# Patient Record
Sex: FEMALE | Race: WHITE | NOT HISPANIC OR LATINO
[De-identification: names, ages, dates, MRNs, and addresses within clinical notes are randomized per-mention and may not be internally consistent; named-entity substitution may affect disease eponyms.]

---

## 2017-01-21 ENCOUNTER — HOSPITAL (OUTPATIENT)
Dept: OTHER | Age: 22
End: 2017-01-21
Attending: EMERGENCY MEDICINE

## 2018-07-02 ENCOUNTER — OFFICE VISIT (OUTPATIENT)
Dept: FAMILY MEDICINE | Facility: CLINIC | Age: 23
End: 2018-07-02
Payer: COMMERCIAL

## 2018-07-02 VITALS
DIASTOLIC BLOOD PRESSURE: 84 MMHG | SYSTOLIC BLOOD PRESSURE: 121 MMHG | TEMPERATURE: 98.8 F | RESPIRATION RATE: 14 BRPM | OXYGEN SATURATION: 98 % | BODY MASS INDEX: 21.63 KG/M2 | WEIGHT: 134.6 LBS | HEART RATE: 83 BPM | HEIGHT: 66 IN

## 2018-07-02 DIAGNOSIS — Z11.3 ROUTINE SCREENING FOR STI (SEXUALLY TRANSMITTED INFECTION): ICD-10-CM

## 2018-07-02 DIAGNOSIS — B00.9 HSV INFECTION: ICD-10-CM

## 2018-07-02 DIAGNOSIS — Z00.00 ENCOUNTER FOR PREVENTIVE HEALTH EXAMINATION: Primary | ICD-10-CM

## 2018-07-02 RX ORDER — DESOGESTREL AND ETHINYL ESTRADIOL 0.15-0.03
1 KIT ORAL DAILY
COMMUNITY
End: 2018-09-10

## 2018-07-02 RX ORDER — CETIRIZINE HYDROCHLORIDE 10 MG/1
10 TABLET ORAL DAILY
COMMUNITY

## 2018-07-02 RX ORDER — ACYCLOVIR 400 MG/1
400 TABLET ORAL 2 TIMES DAILY
Qty: 180 TABLET | Refills: 3 | Status: SHIPPED | OUTPATIENT
Start: 2018-07-02

## 2018-07-02 ASSESSMENT — PATIENT HEALTH QUESTIONNAIRE - PHQ9: 5. POOR APPETITE OR OVEREATING: NOT AT ALL

## 2018-07-02 ASSESSMENT — ANXIETY QUESTIONNAIRES
6. BECOMING EASILY ANNOYED OR IRRITABLE: NOT AT ALL
5. BEING SO RESTLESS THAT IT IS HARD TO SIT STILL: NOT AT ALL
3. WORRYING TOO MUCH ABOUT DIFFERENT THINGS: NOT AT ALL
7. FEELING AFRAID AS IF SOMETHING AWFUL MIGHT HAPPEN: NOT AT ALL
IF YOU CHECKED OFF ANY PROBLEMS ON THIS QUESTIONNAIRE, HOW DIFFICULT HAVE THESE PROBLEMS MADE IT FOR YOU TO DO YOUR WORK, TAKE CARE OF THINGS AT HOME, OR GET ALONG WITH OTHER PEOPLE: NOT DIFFICULT AT ALL
2. NOT BEING ABLE TO STOP OR CONTROL WORRYING: NOT AT ALL
GAD7 TOTAL SCORE: 0
1. FEELING NERVOUS, ANXIOUS, OR ON EDGE: NOT AT ALL

## 2018-07-02 NOTE — NURSING NOTE
"23 year old  Chief Complaint   Patient presents with     Physical     No concerns. Last PAP 2-3 years ago, WNL.        Blood pressure 121/84, pulse 83, temperature 98.8  F (37.1  C), temperature source Oral, resp. rate 14, height 5' 5.7\" (166.9 cm), weight 134 lb 9.6 oz (61.1 kg), last menstrual period 06/18/2018, SpO2 98 %, not currently breastfeeding. Body mass index is 21.92 kg/(m^2).  BP completed using cuff size:    There is no problem list on file for this patient.      Wt Readings from Last 2 Encounters:   07/02/18 134 lb 9.6 oz (61.1 kg)     BP Readings from Last 3 Encounters:   07/02/18 121/84       Allergies   Allergen Reactions     Penicillins        Current Outpatient Prescriptions   Medication     acyclovir (ZOVIRAX) 400 MG tablet     cetirizine (ZYRTEC) 10 MG tablet     desogestrel-ethinyl estradiol (APRI) 0.15-30 MG-MCG per tablet     [DISCONTINUED] ACYCLOVIR PO     No current facility-administered medications for this visit.        Social History   Substance Use Topics     Smoking status: Never Smoker     Smokeless tobacco: Never Used     Alcohol use Yes      Comment: 4 drinks/week       Honoring Choices - Health Care Directive Guide offered to patient at time of visit.    Health Maintenance Due   Topic Date Due     HPV IMMUNIZATION (1 of 3 - Female 3 Dose Series) 01/09/2004     PHQ-2 Q1 YR  01/09/2005     TETANUS IMMUNIZATION (SYSTEM ASSIGNED)  01/09/2011     HIV SCREEN (SYSTEM ASSIGNED)  01/09/2011     PAP SCREENING Q3 YR (SYSTEM ASSIGNED)  01/09/2014         There is no immunization history on file for this patient.    No results found for: PAP      No lab results found.    PHQ-2 ( 1999 Pfizer) 7/2/2018   Q1: Little interest or pleasure in doing things 0   Q2: Feeling down, depressed or hopeless 0   PHQ-2 Score 0       PHQ-9 SCORE 7/2/2018   Total Score 0       SOHA-7 SCORE 7/2/2018   Total Score 0       No flowsheet data found.    Annmarie Ford [facility]  July 2, 2018 10:43 AM  "

## 2018-07-02 NOTE — PATIENT INSTRUCTIONS
1) Lab results pending. If everything results normal, I will mail you a letter. Otherwise I will call you on the phone.   2) Refill sent to pharmacy. Directions for administration are below.  3) Return to clinic as needed and in one year for next routine health maintenance exam.  4) Give me a call if/when you need your birth control refilled.   5) It was great to meet you!    Recurrence: 400 mg 3 times daily for 5 days or 800 mg twice daily for 5 days or 800 mg 3 times daily for 2 days; Note: Initiate within 1 day of lesion onset or during the preceding prodrome (CDC [Workowski 2015]).  Chronic suppression: 400 mg twice daily for up to 12 months (followed by re-evaluation); Note: Safety and efficacy have been documented in patients receiving daily therapy with acyclovir for up to 6 years (CDC [Workowski 2015])

## 2018-07-02 NOTE — PROGRESS NOTES
SUBJECTIVE:   Dinorah Daniels is an 23 year old year old woman who presents for preventive health visit.     Herpes simplex virus, genital:  Reports diagnosis in 2015 while abroad in [place]. Was never tested for it via culture or blood draw, was diagnosed clinically. Has not had an outbreak since that time. Treated with acyclovir for initial outbreak and takes acyclovir for suppression when with new partners. She is requesting a refill of this medication today.     Oral contraceptive use:  Dinorah is currently taking Apia daily. She has been tolerating this medication without issues. Has been on Apria, or similar OCP for several years. She is not due for a refill today, however believes she will need a refill in 3 months.     Healthy Habits:    Do you get at least three servings of calcium containing foods daily (dairy, green leafy vegetables, etc.)? No, lots of vegetables but little intake of other calcium containing foods.    Amount of exercise or daily activities, outside of work: started running in November. Enjoys biking.     Have you had an eye exam in the past two years? Yes    Do you see a dentist twice per year? Yes    Do you have sleep apnea, excessive snoring or daytime drowsiness? No    Sleep: 7-8 hours/night. Sleep disruptions: No    Caffeine: 1-2 cups/day of coffee    Water: water bottle on her all day    Past Medical History:   Diagnosis Date     HSV infection 2015    Genital     Otitis media 2017     Past Surgical History:   Procedure Laterality Date     HC TOOTH EXTRACTION W/FORCEP       Family History   Problem Relation Age of Onset     No Known Problems Mother      No Known Problems Father      No Known Problems Sister      No Known Problems Brother      Rheumatoid Arthritis Maternal Grandmother      Lymphoma Maternal Grandfather      Alzheimer Disease Paternal Grandmother      Type 2 Diabetes Paternal Grandfather      Lymphoma Paternal Grandfather      Social History     Social History      Marital status: Single     Spouse name: N/A     Number of children: 0     Years of education: 17     Occupational History     Student      [employer]     Social History Main Topics     Smoking status: Never Smoker     Smokeless tobacco: Never Used     Alcohol use Yes      Comment: 4 drinks/week     Drug use: No     Sexual activity: Yes     Partners: Male     Birth control/ protection: Pill     Other Topics Concern     Not on file     Social History Narrative    Grew up in [place], [place]. 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        Today's PHQ-2 Score: PHQ-2 Score:   PHQ-2 ( 1999 Pfizer) 7/2/2018   Q1: Little interest or pleasure in doing things 0   Q2: Feeling down, depressed or hopeless 0   PHQ-2 Score 0       Today's PHQ-9 Score:   PHQ-9 SCORE 7/2/2018   Total Score 0       Today's SOHA-7 Score:   SOHA-7 SCORE 7/2/2018   Total Score 0       Abuse: Current or Past (Physical, Sexual or Emotional) - No  Do you feel safe in your environment - Yes    If you drink alcohol do you typically have >3 drinks per day or >7 drinks per week? No                     Reviewed orders with patient.  Reviewed health maintenance and updated orders accordingly - Yes    Mammogram not appropriate for this patient based on age. Pertinent mammograms are reviewed under the imaging tab.    History of abnormal Pap smear: NO - age 21-29 PAP every 3 years recommended.    Reviewed and updated as needed this visit by clinical staff  Tobacco  Allergies  Meds       Reviewed and updated as needed this visit by Provider    ROS:  CONSTITUTIONAL: NEGATIVE for fever, chills, change in weight  INTEGUMENTARU/SKIN: NEGATIVE for worrisome rashes, moles or lesions  EYES: NEGATIVE for vision changes or irritation  ENT: NEGATIVE for ear, mouth and throat problems  RESP: NEGATIVE for significant cough or SOB  BREAST: NEGATIVE for masses, tenderness or  "discharge  CV: NEGATIVE for chest pain, palpitations or peripheral edema  GI: NEGATIVE for nausea, abdominal pain, heartburn, or change in bowel habits  : NEGATIVE for unusual urinary or vaginal symptoms. Periods are regular.  MUSCULOSKELETAL: NEGATIVE for significant arthralgias or myalgia  NEURO: NEGATIVE for weakness, dizziness or paresthesias  PSYCHIATRIC: NEGATIVE for changes in mood or affect    OBJECTIVE:   /84 (BP Location: Left arm, Patient Position: Chair, Cuff Size: Adult Regular)  Pulse 83  Temp 98.8  F (37.1  C) (Oral)  Resp 14  Ht 5' 5.7\" (166.9 cm)  Wt 115 lb (52.2 kg)  LMP 06/18/2018  SpO2 98%  Breastfeeding? No  BMI 18.73 kg/m2      EXAM:  Constitutional: appears to be in no acute distress, comfortable, pleasant.   Eyes: anicteric, conjunctiva clear without drainage or erythema. JENNIFER, red reflex present bilaterally.   Ears, Nose and Throat: tympanic membranes gray with LR,  nose without nasal discharge. OP: no erythema to posterior pharynx, negative post nasal drainage, tonsils +1 no erythema or exudate.  Neck: supple, thyroid palpable,not enlarged, no nodules   Breast: No nipple discharge, bilateral nipple inversion, which is reportedly baseline, no dominant masses, tenderness to palpation, axillary, supraclavicular, or infraclavicular adenopathy.  Cardiovascular: regular rate and rhythm, normal S1 and S2, no murmurs, rubs or gallops, peripheral pulses full and symmetric; negative peripheral edema   Respiratory: Air entry throughout. Breathing pattern unlabored without the use of accessory muscles. Clear to auscultation A and P, no wheezes or crackles, normal breath sounds.    Gastrointestinal: rounded, soft. Positive bowel sounds x4, nontender, no masses.   Genitourinary: external genitalia is without lesions. Introitus is normal, vaginal walls pink and moist without lesions or evidence of trauma. There is no cervical motion tenderness and the adnexa are without masses. There is " no abnormal discharge from the cervix. Small amount of creamy white discharge noted to come from cervix. Cervix is pink without polyps or lesions.  Musculoskeletal: full range of motion, no edema.   Skin: pink, turgor smooth and elastic. Negative for lesions or dryness.  Neurological: normal gait, no tremor.   Psychological: appropriate mood and affect.   Lymphatic: no cervical, axillary, supraclavicular, or infraclavicular lymphadenopathy.    Laboratory results pending:  Pap imaged thin layer screen reflex to HPV if ASCUS    ASSESSMENT/PLAN:     1. Encounter for preventive health examination  Comment: Reports last pap ~2-3 years ago, likely 3 years ago. Denies history of abnormal paps and HPV, has completed HPV vaccine series. Unclear if prior screen for HIV, however since no other lab work today, patient defers HIV screen. All immunization records reportedly in [place], however reports being immunized as a child and reports being up to date on tetanus. Had cholesterol checked last year, it was reportedly normal.  Plan:   - Pap imaged thin layer screen reflex to HPV if ASCUS - recommend age 25 - 29   - Contact clinic when OCP refill needed   - Continue regular exercise, goal for 150 minutes/week   - Consider calcium/vitamin D supplement to prevent deficiency   - Continue dental visits every 6 months   - Return to clinic in 1 year for routine preventative health exam    2. Routine screening for STI (sexually transmitted infection)  Comment: Denies current s/sx. Denies high risk sexual activity.  Plan:   - NEISSERIA GONORRHOEA PCR   - CHLAMYDIA TRACHOMATIS PCR    3. HSV infection  Comment: Reports diagnosis in 2015 while abroad in [place]. Was never tested for it via culture or blood draw, was diagnosed clinically. Has not had an outbreak since that time. Treated with acyclovir for initial outbreak and takes acyclovir for suppression when with new partners. She is requesting a refill of this medication today. Will  "prescribe acyclovir 400 mg tablets to serve as treatment for outbreaks and prn suppressive therapy. Counseled Dinorah on appropriate administration of this medication for both circumstances.  Plan:   - acyclovir (ZOVIRAX) 400 MG tablet; Take 1 tablet (400 mg) by mouth 2 times daily  Dispense: 180 tablet; Refill: 3   - Recurrence: 400 mg 3 times daily for 5 days or 800 mg twice daily for 5 days or 800 mg 3 times daily for 2 days; Note: Initiate within 1 day of lesion onset or during the preceding prodrome (CDC [Workowski 2015]).   - Chronic suppression: 400 mg twice daily for up to 12 months (followed by re-evaluation); Note: Safety and efficacy have been documented in patients receiving daily therapy with acyclovir for up to 6 years (CDC [Workowski 2015])      Follow-up: Lab results pending, will follow-up as necessary. Return to clinic in 1 year for routine preventative health exam.        COUNSELING:   Reviewed preventive health counseling, as reflected in patient instructions  Special attention given to:        Regular exercise       Healthy diet/nutrition       Vision screening       Immunizations       Contraception       Osteoporosis Prevention/Bone Health       Safe sex practices/STD prevention       HIV screeninx in teen years, 1x in adult years, and at intervals if high risk        Reports that she has never smoked. She has never used smokeless tobacco.    Estimated body mass index is 23.78 kg/(m^2) as calculated from the following:    Height as of this encounter: 5' 2\" (157.5 cm).    Weight as of this encounter: 130 lb (59 kg).       Counseling Resources:  ATP IV Guidelines  Pooled Cohorts Equation Calculator  Breast Cancer Risk Calculator  FRAX Risk Assessment  ICSI Preventive Guidelines  Dietary Guidelines for Americans,   USDA's MyPlate  ASA Prophylaxis  Lung CA Screening    KO Crisostomo CNP on 2018 at 8:09 AM  [facility]  "

## 2018-07-02 NOTE — MR AVS SNAPSHOT
After Visit Summary   7/2/2018    Dinorah Daniels    MRN: [id number]           Patient Information     Date Of Birth          1995        Visit Information        Provider Department      7/2/2018 8:00 AM Suyapa Barron APRN CNP [facility]        Today's Diagnoses     Encounter for preventive health examination    -  1    Routine screening for STI (sexually transmitted infection)        HSV infection          Care Instructions    1) Lab results pending. If everything results normal, I will mail you a letter. Otherwise I will call you on the phone.   2) Refill sent to pharmacy. Directions for administration are below.  3) Return to clinic as needed and in one year for next routine health maintenance exam.  4) Give me a call if/when you need your birth control refilled.   5) It was great to meet you!    Recurrence: 400 mg 3 times daily for 5 days or 800 mg twice daily for 5 days or 800 mg 3 times daily for 2 days; Note: Initiate within 1 day of lesion onset or during the preceding prodrome (CDC [Workowski 2015]).  Chronic suppression: 400 mg twice daily for up to 12 months (followed by re-evaluation); Note: Safety and efficacy have been documented in patients receiving daily therapy with acyclovir for up to 6 years (CDC [Workowski 2015])            Follow-ups after your visit        Who to contact     Please call your clinic at [phone] to:    Ask questions about your health    Make or cancel appointments    Discuss your medicines    Learn about your test results    Speak to your doctor            Additional Information About Your Visit        MyChart Information     [email or web address] is an electronic gateway that provides easy, online access to your medical records. With Loot!, you can request a clinic appointment, read your test results, renew a prescription or communicate with your care team.     To sign up for [email or web address] visit the website at [email or web address]   You will be asked  "to enter the access code listed below, as well as some personal information. Please follow the directions to create your username and password.     Your access code is: [email or web address]  Expires: 2018  8:59 AM     Your access code will  in 90 days. If you need help or a new code, please contact your [facility] or call [phone] for assistance.        Care EveryWhere ID     This is your Care EveryWhere ID. This could be used by other organizations to access your [place] medical records  [id number]        Your Vitals Were     Pulse Temperature Respirations Height Last Period Pulse Oximetry    83 98.8  F (37.1  C) (Oral) 14 5' 5.7\" (166.9 cm) 2018 98%    Breastfeeding? BMI (Body Mass Index)                No 18.73 kg/m2           Blood Pressure from Last 3 Encounters:   18 121/84    Weight from Last 3 Encounters:   18 115 lb (52.2 kg)              We Performed the Following     CHLAMYDIA TRACHOMATIS PCR     NEISSERIA GONORRHOEA PCR     Pap imaged thin layer screen reflex to HPV if ASCUS - recommend age 25 - 29          Today's Medication Changes          These changes are accurate as of 18  8:59 AM.  If you have any questions, ask your nurse or doctor.               Start taking these medicines.        Dose/Directions    acyclovir 400 MG tablet   Commonly known as:  ZOVIRAX   Used for:  HSV infection   Started by:  Suyapa Barron APRN CNP        Dose:  400 mg   Take 1 tablet (400 mg) by mouth 2 times daily   Quantity:  180 tablet   Refills:  3            Where to get your medicines      These medications were sent to [facility] [address] [address] [address] [facility] [address] [facility] [address] [place] [address] [address] [facility] [address]     Phone:  [phone]     acyclovir 400 MG tablet                Primary Care Provider    None Specified       No primary provider on file.        Equal Access to Services     KRAIG WOODSON AH: " Hadii duong larios Radhikayazanali, wahomarda luqadaha, qaybta kaalcresencio mendez, nya maria antoniain hayaalyric elizondojulio khan latalilyric amanda. So [facility] [phone].    ATENCIÓN: Si habla rukhsana, tiene a warren disposición servicios gratuitos de asistencia lingüística. Llame al [phone].    We comply with applicable federal civil rights laws and [place] laws. We do not discriminate on the basis of race, color, national origin, age, disability, sex, sexual orientation, or gender identity.            Thank you!     Thank you for choosing [facility]  for your care. Our goal is always to provide you with excellent care. Hearing back from our patients is one way we can continue to improve our services. Please take a few minutes to complete the written survey that you may receive in the mail after your visit with us. Thank you!             Your Updated Medication List - Protect others around you: Learn how to safely use, store and throw away your medicines at www.disposemymeds.org.          This list is accurate as of 7/2/18  8:59 AM.  Always use your most recent med list.                   Brand Name Dispense Instructions for use Diagnosis    acyclovir 400 MG tablet    ZOVIRAX    180 tablet    Take 1 tablet (400 mg) by mouth 2 times daily    HSV infection       cetirizine 10 MG tablet    zyrTEC     Take 10 mg by mouth daily        desogestrel-ethinyl estradiol 0.15-30 MG-MCG per tablet    APRI     Take 1 tablet by mouth daily

## 2018-07-02 NOTE — NURSING NOTE
"23 year old  Chief Complaint   Patient presents with     Physical     No concerns. Last PAP 2-3 years ago, WNL.        Blood pressure 121/84, pulse 83, temperature 98.8  F (37.1  C), temperature source Oral, resp. rate 14, height 5' 5.7\" (166.9 cm), weight 115 lb (52.2 kg), last menstrual period 06/18/2018, SpO2 98 %, not currently breastfeeding. Body mass index is 18.73 kg/(m^2).  BP completed using cuff size:    There is no problem list on file for this patient.      Wt Readings from Last 2 Encounters:   07/02/18 115 lb (52.2 kg)     BP Readings from Last 3 Encounters:   07/02/18 121/84       Allergies   Allergen Reactions     Penicillins        Current Outpatient Prescriptions   Medication     ACYCLOVIR PO     desogestrel-ethinyl estradiol (APRI) 0.15-30 MG-MCG per tablet     No current facility-administered medications for this visit.        Social History   Substance Use Topics     Smoking status: Never Smoker     Smokeless tobacco: Never Used     Alcohol use Not on file       Honoring Choices - Health Care Directive Guide offered to patient at time of visit.    Health Maintenance Due   Topic Date Due     HPV IMMUNIZATION (1 of 3 - Female 3 Dose Series) 01/09/2004     PHQ-2 Q1 YR  01/09/2005     TETANUS IMMUNIZATION (SYSTEM ASSIGNED)  01/09/2011     HIV SCREEN (SYSTEM ASSIGNED)  01/09/2011     PAP SCREENING Q3 YR (SYSTEM ASSIGNED)  01/09/2014         There is no immunization history on file for this patient.    No results found for: PAP      No lab results found.    PHQ-2 ( 1999 Pfizer) 7/2/2018   Q1: Little interest or pleasure in doing things 0   Q2: Feeling down, depressed or hopeless 0   PHQ-2 Score 0       PHQ-9 SCORE 7/2/2018   Total Score 0       SOHA-7 SCORE 7/2/2018   Total Score 0       No flowsheet data found.    Annmarie Ford CMA  July 2, 2018 8:10 AM  "

## 2018-07-03 LAB
C TRACH DNA SPEC QL NAA+PROBE: NEGATIVE
N GONORRHOEA DNA SPEC QL NAA+PROBE: NEGATIVE
SPECIMEN SOURCE: NORMAL
SPECIMEN SOURCE: NORMAL

## 2018-07-03 ASSESSMENT — PATIENT HEALTH QUESTIONNAIRE - PHQ9: SUM OF ALL RESPONSES TO PHQ QUESTIONS 1-9: 0

## 2018-07-03 ASSESSMENT — ANXIETY QUESTIONNAIRES: GAD7 TOTAL SCORE: 0

## 2018-07-05 LAB
COPATH REPORT: NORMAL
PAP: NORMAL

## 2018-07-06 ENCOUNTER — TELEPHONE (OUTPATIENT)
Dept: FAMILY MEDICINE | Facility: CLINIC | Age: 23
End: 2018-07-06

## 2018-07-06 NOTE — TELEPHONE ENCOUNTER
Called to inform patient that pap results and STI testing from most recent office visit on 7/2/18 were all normal. Next pap in three years (2021). Encouraged Dinorah to call the clinic with any medical needs in the future. She appreciates the call and has no questions.     Suyapa aBrron, DNP, APRN, CNP

## 2018-09-10 ENCOUNTER — TELEPHONE (OUTPATIENT)
Dept: FAMILY MEDICINE | Facility: CLINIC | Age: 23
End: 2018-09-10

## 2018-09-10 DIAGNOSIS — Z30.41 SURVEILLANCE FOR BIRTH CONTROL, ORAL CONTRACEPTIVES: Primary | ICD-10-CM

## 2018-09-10 RX ORDER — DESOGESTREL AND ETHINYL ESTRADIOL 0.15-0.03
1 KIT ORAL DAILY
Qty: 84 TABLET | Refills: 3 | Status: SHIPPED | OUTPATIENT
Start: 2018-09-10

## 2018-09-10 NOTE — TELEPHONE ENCOUNTER
Patient is calling to request a refill on Apri. She would like it sent to a pharmacy in [place] where she currently resides. The pharmacy is [facility] phone number is [phone]. [address] [address].